# Patient Record
Sex: FEMALE | Race: OTHER | URBAN - METROPOLITAN AREA
[De-identification: names, ages, dates, MRNs, and addresses within clinical notes are randomized per-mention and may not be internally consistent; named-entity substitution may affect disease eponyms.]

---

## 2022-02-09 ENCOUNTER — EMERGENCY (EMERGENCY)
Facility: HOSPITAL | Age: 47
LOS: 0 days | Discharge: HOME | End: 2022-02-09
Attending: EMERGENCY MEDICINE
Payer: COMMERCIAL

## 2022-02-09 VITALS
TEMPERATURE: 98 F | WEIGHT: 138.89 LBS | SYSTOLIC BLOOD PRESSURE: 143 MMHG | DIASTOLIC BLOOD PRESSURE: 73 MMHG | HEART RATE: 67 BPM | OXYGEN SATURATION: 100 % | HEIGHT: 63 IN | RESPIRATION RATE: 15 BRPM

## 2022-02-09 DIAGNOSIS — M25.511 PAIN IN RIGHT SHOULDER: ICD-10-CM
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
## 2022-02-09 DIAGNOSIS — Z88.6 ALLERGY STATUS TO ANALGESIC AGENT: ICD-10-CM
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
## 2022-02-09 PROCEDURE — 99284 EMERGENCY DEPT VISIT MOD MDM: CPT

## 2022-02-09 PROCEDURE — 73030 X-RAY EXAM OF SHOULDER: CPT | Mod: 26,RT

## 2022-02-09 RX ORDER — ALBUTEROL 90 UG/1
0 AEROSOL, METERED ORAL
Qty: 0 | Refills: 0 | DISCHARGE

## 2022-02-09 RX ORDER — KETOROLAC TROMETHAMINE 30 MG/ML
60 SYRINGE (ML) INJECTION ONCE
Refills: 0 | Status: DISCONTINUED | OUTPATIENT
Start: 2022-02-09 | End: 2022-02-09

## 2022-02-09 RX ADMIN — Medication 60 MILLIGRAM(S): at 13:22

## 2022-02-09 NOTE — ED PROVIDER NOTE - OBJECTIVE STATEMENT
47 y/o F with a PMHx of Asthma presents to the ED c/o RT shoulder pain. 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 Patient states while pulling apart the garbage bag to tie it she felt a strain in her RT shoulder. Patient took Motrin 400mg at 730p for pain but woke up with worsening pain. Patient describes pain as a 10/10 in severity along the deltoid radiating up to the neck. Denies trauma, injuries or any other related symptoms.

## 2022-02-09 NOTE — ED PROVIDER NOTE - MUSCULOSKELETAL, MLM
Spine appears normal, range of motion is not limited. Tenderness to deltoid and pain with movement. Able to cross midline without difficulty.

## 2022-02-09 NOTE — ED ADULT NURSE NOTE - OBJECTIVE STATEMENT
Right shoulder pain after pulling something yesterday, took Motrin yesterday but wake up with worst pains. LMP last may 2021. Limited ROM

## 2022-02-09 NOTE — ED PROVIDER NOTE - CARE PROVIDER_API CALL
Lane Garcia (DO)  Orthopaedic Surgery  [address] [place] [address]  Phone: [phone]  Fax: [phone]  Follow Up Time:

## 2022-02-09 NOTE — ED PROVIDER NOTE - PATIENT PORTAL LINK FT
You can access the FollowMyHealth Patient Portal offered by [facility] by registering at the following website: [email or web address]. By joining [employer]’s FollowMyHealth portal, you will also be able to view your health information using other applications (apps) compatible with our system.

## 2022-02-09 NOTE — ED PROVIDER NOTE - CLINICAL SUMMARY MEDICAL DECISION MAKING FREE TEXT BOX
Patient with shoulder pain likely muscle strain from pulling outward. Will give pain medication and XR to rule out fracture. Low suspicion for dislocation.

## 2022-02-09 NOTE — ED PROVIDER NOTE - NSFOLLOWUPINSTRUCTIONS_ED_ALL_ED_FT
1) Take tylenol or motrin for pain  2) Use heat therapy  3) Do muscle rubs/masage  4) Follow up with your primary care doctor  5) Return to the ER for worsening or concerning symptoms  6) If symptoms do not improve follow-up with Ortho